# Patient Record
Sex: FEMALE | Race: WHITE
[De-identification: names, ages, dates, MRNs, and addresses within clinical notes are randomized per-mention and may not be internally consistent; named-entity substitution may affect disease eponyms.]

---

## 2018-10-25 ENCOUNTER — HOSPITAL ENCOUNTER (OUTPATIENT)
Dept: HOSPITAL 61 - KCIC MRI | Age: 23
Discharge: HOME | End: 2018-10-25
Attending: ORTHOPAEDIC SURGERY
Payer: COMMERCIAL

## 2018-10-25 DIAGNOSIS — M25.561: Primary | ICD-10-CM

## 2018-10-25 PROCEDURE — 73721 MRI JNT OF LWR EXTRE W/O DYE: CPT

## 2018-10-25 NOTE — [facility]
MR of the right knee

 

Indication: Right knee pain for about 2 years after an injury. Pain is 

medial.

 

Technique: The standard multiplanar sequences are obtained.

 

FINDINGS:

Artifact: No significant image degradation.

 

Medial meniscus:Intact.

Lateral meniscus: Intact.

 

Anterior cruciate ligament: Intact

Posterior cruciate ligament: Intact

Medial collateral ligament: Intact.

 

Lateral structures:

*  Iliotibial band: Intact.

*  Lateral collateral ligament: Intact.

*  Biceps femoris tendon: Intact

*  Popliteus tendon attachment: Intact

 

Extensive mechanism:

*  Patellar tendon: Intact

*  Quadriceps tendon: Intact

*  Retinacular structures: Intact

 

Fluid: No significant joint effusion. No significant Baker's cyst.

 

Intra-articular bodies: None visualized

 

Joint compartments

*  patellofemoral joint:Intact

*  medial compartment:Intact

*  lateral compartment:Intact

 

Bones: No significant lesion or acute fracture.

Soft tissue: Unremarkable

 

Impression: No evidence of acute abnormality or internal derangement.

 

Electronically signed by: Jovi Robertson MD (10/25/2018 4:01 PM) [facility]

## 2020-05-13 ENCOUNTER — HOSPITAL ENCOUNTER (EMERGENCY)
Dept: HOSPITAL 61 - ER | Age: 25
Discharge: HOME | End: 2020-05-13
Payer: COMMERCIAL

## 2020-05-13 VITALS — HEIGHT: 64 IN | BODY MASS INDEX: 25.59 KG/M2 | WEIGHT: 149.91 LBS

## 2020-05-13 VITALS — SYSTOLIC BLOOD PRESSURE: 128 MMHG | DIASTOLIC BLOOD PRESSURE: 73 MMHG

## 2020-05-13 DIAGNOSIS — Z88.1: ICD-10-CM

## 2020-05-13 DIAGNOSIS — T82.838A: Primary | ICD-10-CM
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
## 2020-05-13 PROCEDURE — 99281 EMR DPT VST MAYX REQ PHY/QHP: CPT

## 2020-05-23 NOTE — PHYS DOC
General Adult


EDM:


Chief Complaint:  OTHER COMPLAINTS





HPI:


HPI:





Patient is a 24  year old female who presents to have her PICC line evaluated.  

Patient states that she had her PICC line placed a few days ago and then she 

thinks she may have bumped it after which she noted a small amount of bleeding. 

She denies any pain to the area.  She denies any fever.  []





Review of Systems:


Review of Systems:


Constitutional:  Denies fever or chills. []


Respiratory:  Denies cough or shortness of breath. [] 


Cardiovascular:  Denies chest pain or edema. [] 


Integument:  Denies rash. [] 


Neurologic:  Denies headache, focal weakness or sensory changes. []





Heart Score:


Risk Factors:


Risk Factors:  DM, Current or recent (<one month) smoker, HTN, HLP, family 

history of CAD, obesity.


Risk Scores:


Score 0 - 3:  2.5% MACE over next 6 weeks - Discharge Home


Score 4 - 6:  20.3% MACE over next 6 weeks - Admit for Clinical Observation


Score 7 - 10:  72.7% MACE over next 6 weeks - Early Invasive Strategies





Allergies:


Allergies:





Allergies








Coded Allergies Type Severity Reaction Last Updated Verified


 


  clindamycin Allergy Mild Rash 5/13/20 Yes











Physical Exam:


PE:





Constitutional: Well developed, well nourished, no acute distress, non-toxic 

appearance. []


Cardiovascular: Regular rate and rhythm []


Lungs & Thorax:  Bilateral breath sounds clear to auscultation []


Skin: Skin around PICC line site is clean dry and intact with no active 

bleeding.  No signs of infection on exam. []





EKG:


EKG:


[]





Radiology/Procedures:


Radiology/Procedures:


[]





Course & Med Decision Making:


Course & Med Decision Making


Pertinent Labs and Imaging studies reviewed. (See chart for details)





[]





Dragon Disclaimer:


Dragon Disclaimer:


This electronic medical record was generated, in whole or in part, using a voice

 recognition dictation system.





Departure


Departure


Impression:  


   Primary Impression:  


   Bleeding from PICC line


   Qualified Codes:  T82.838A - Hemorrhage due to vascular prosthetic devices, 

   implants and grafts, initial encounter


Disposition:  01 HOME, SELF-CARE


Condition:  STABLE


Referrals:  


NO PCP (PCP)


Patient Instructions:  PICC Home Guide











NICOLAS NELSON Jr. DO          May 13, 2020 22:27
No